# Patient Record
Sex: FEMALE | Race: WHITE | NOT HISPANIC OR LATINO | URBAN - METROPOLITAN AREA
[De-identification: names, ages, dates, MRNs, and addresses within clinical notes are randomized per-mention and may not be internally consistent; named-entity substitution may affect disease eponyms.]

---

## 2020-11-18 ENCOUNTER — OFFICE VISIT - HEALTHEAST (OUTPATIENT)
Dept: FAMILY MEDICINE | Facility: CLINIC | Age: 35
End: 2020-11-18

## 2020-11-18 DIAGNOSIS — Z20.822 SUSPECTED COVID-19 VIRUS INFECTION: ICD-10-CM

## 2020-12-28 ENCOUNTER — OFFICE VISIT - HEALTHEAST (OUTPATIENT)
Dept: FAMILY MEDICINE | Facility: CLINIC | Age: 35
End: 2020-12-28

## 2020-12-28 DIAGNOSIS — R05.9 COUGH: ICD-10-CM

## 2021-06-13 NOTE — PROGRESS NOTES
"Sara Segovia is a 35 y.o. female who is being evaluated via a billable video visit.      The patient has been notified of following:     \"This video visit will be conducted via a call between you and your physician/provider. We have found that certain health care needs can be provided without the need for an in-person physical exam.  This service lets us provide the care you need with a video conversation.  If a prescription is necessary we can send it directly to your pharmacy.  If lab work is needed we can place an order for that and you can then stop by our lab to have the test done at a later time.    Video visits are billed at different rates depending on your insurance coverage. Please reach out to your insurance provider with any questions.    If during the course of the call the physician/provider feels a video visit is not appropriate, you will not be charged for this service.\"    Patient has given verbal consent to a Video visit? Yes  How would you like to obtain your AVS? AVS Preference: Mail a copy.  If dropped by the video visit, the video invitation should be sent to: Text to cell phone: [phone]  Will anyone else be joining your video visit? No        Video Start Time: 8:30 AM    Patient seeks video visit consultation today with regard to a suspected COVID-19 infection.    Her  was diagnosed with COVID-19 a little less than 2 weeks ago.  She started developing symptoms at around the same time and thinks that she has been experiencing symptoms for about 13 days now.    At times she will have a problematic cough; this seems to be worse in the morning.  However, she does not feel like she is in respiratory distress.    She actually has an oximeter that she has been using every day or so.  Her oxygen saturations are 97% right now.    At times she will have a bit of chest pressure but denies pleurisy.  No hemoptysis.    Eating and drinking normally.    She considers herself to be a healthy " person.  No significant underlying medical issues as far she knows.  She understands that she is overdue for a CPE and would like to establish care with a new provider at some point in the coming weeks.    1. Suspected COVID-19 virus infection  I provided her with reassurance today that she seems to be recovering nicely from her suspected COVID-19 diagnosis.  We reviewed warning signs/symptoms for when she should seek immediate medical evaluation.  In the meantime, we will reach out to her and help her make an appointment to establish care with a new PCP in the next few weeks.          Video-Visit Details    Type of service:  Video Visit    Video End Time (time video stopped): 8:53 AM  Originating Location (pt. Location): Home    Distant Location (provider location):  [facility]     Platform used for Video Visit: Vickie Vicente CNP

## 2021-06-14 NOTE — PROGRESS NOTES
"Sara Segovia is a 35 y.o. female who is being evaluated via a billable video visit.      The patient has been notified of following:     \"This video visit will be conducted via a call between you and your physician/provider. We have found that certain health care needs can be provided without the need for an in-person physical exam.  This service lets us provide the care you need with a video conversation.  If a prescription is necessary we can send it directly to your pharmacy.  If lab work is needed we can place an order for that and you can then stop by our lab to have the test done at a later time.    Video visits are billed at different rates depending on your insurance coverage. Please reach out to your insurance provider with any questions.    If during the course of the call the physician/provider feels a video visit is not appropriate, you will not be charged for this service.\"    Patient has given verbal consent to a Video visit? Yes  How would you like to obtain your AVS? AVS Preference: Mail a copy.  If dropped by the video visit, the video invitation should be sent to: Text to cell phone: [phone]  Will anyone else be joining your video visit? No       Video Start Time: 8:27 AM    Additional provider notes: Video visit    The patient's  was diagnosed with Covid back in early November, shortly after he started to have symptoms she developed symptoms such as congestion and a sense of her heart racing congestion loss of smell.  She herself was never tested.  Most of those symptoms have resolved but she still has a lot of cough, generally nonproductive.  She does not have a history of asthma or breathing problems she has been taking over-the-counter DayQuil and NyQuil though these have been minimally effective.    No fever or chills she does not feel short of breath, told the patient I suspect that she most likely did in fact have Covid there is probably some underlying inflammation, I " think she would respond better to prednisone, rather than an antibiotic.    I told her she can continue symptomatic treatment.  Otherwise she is very healthy she has had no other change in her health status.    Sara was seen today for covid concern and cough.    Diagnoses and all orders for this visit:    Cough  -     predniSONE (DELTASONE) 20 MG tablet; Take 20 mg by mouth 2 (two) times a day for 5 days.  Patient most likely in fact did have Covid back in November I suspect she has a cough due to underlying airway inflammation.    PLAN:  1.  Prednisone 20 mg twice daily x5 days.  2.  Patient can continue symptomatic treatment and follow-up as needed.          GENERAL: Healthy, alert and no distress  EYES: Eyes grossly normal to inspection. No discharge or erythema, or obvious scleral/conjunctival abnormalities.  RESP: No audible wheeze, cough, or visible cyanosis.  No visible retractions or increased work of breathing.    NEURO: Cranial nerves grossly intact. Mentation and speech appropriate for age.  PSYCH: Mentation appears normal, affect normal/bright, judgement and insight intact, normal speech and appearance well-groomed      Video-Visit Details    Type of service:  Video Visit    Video End Time (time video stopped): 8:37 AM  Originating Location (pt. Location): Home    Distant Location (provider location):  [facility]     Platform used for Video Visit: Vickie Mendieta MD